# Patient Record
Sex: FEMALE | Race: WHITE | NOT HISPANIC OR LATINO | Employment: FULL TIME | URBAN - NONMETROPOLITAN AREA
[De-identification: names, ages, dates, MRNs, and addresses within clinical notes are randomized per-mention and may not be internally consistent; named-entity substitution may affect disease eponyms.]

---

## 2017-10-20 ENCOUNTER — OFFICE VISIT (OUTPATIENT)
Dept: OBSTETRICS AND GYNECOLOGY | Facility: CLINIC | Age: 52
End: 2017-10-20

## 2017-10-20 VITALS
HEIGHT: 63 IN | SYSTOLIC BLOOD PRESSURE: 120 MMHG | BODY MASS INDEX: 20.73 KG/M2 | DIASTOLIC BLOOD PRESSURE: 80 MMHG | WEIGHT: 117 LBS

## 2017-10-20 DIAGNOSIS — B96.89 BV (BACTERIAL VAGINOSIS): ICD-10-CM

## 2017-10-20 DIAGNOSIS — N76.0 BV (BACTERIAL VAGINOSIS): ICD-10-CM

## 2017-10-20 DIAGNOSIS — B37.31 CANDIDA VAGINITIS: ICD-10-CM

## 2017-10-20 DIAGNOSIS — Z11.3 SCREEN FOR STD (SEXUALLY TRANSMITTED DISEASE): Primary | ICD-10-CM

## 2017-10-20 LAB — WET PREP GENITAL: ABNORMAL

## 2017-10-20 PROCEDURE — 87798 DETECT AGENT NOS DNA AMP: CPT | Performed by: NURSE PRACTITIONER

## 2017-10-20 PROCEDURE — 87210 SMEAR WET MOUNT SALINE/INK: CPT | Performed by: NURSE PRACTITIONER

## 2017-10-20 PROCEDURE — 87481 CANDIDA DNA AMP PROBE: CPT | Performed by: NURSE PRACTITIONER

## 2017-10-20 PROCEDURE — 87512 GARDNER VAG DNA QUANT: CPT | Performed by: NURSE PRACTITIONER

## 2017-10-20 PROCEDURE — 87491 CHLMYD TRACH DNA AMP PROBE: CPT | Performed by: NURSE PRACTITIONER

## 2017-10-20 PROCEDURE — 99202 OFFICE O/P NEW SF 15 MIN: CPT | Performed by: NURSE PRACTITIONER

## 2017-10-20 PROCEDURE — 87661 TRICHOMONAS VAGINALIS AMPLIF: CPT | Performed by: NURSE PRACTITIONER

## 2017-10-20 PROCEDURE — 87591 N.GONORRHOEAE DNA AMP PROB: CPT | Performed by: NURSE PRACTITIONER

## 2017-10-20 RX ORDER — METRONIDAZOLE 500 MG/1
500 TABLET ORAL 2 TIMES DAILY
Qty: 14 TABLET | Refills: 0 | Status: SHIPPED | OUTPATIENT
Start: 2017-10-20 | End: 2017-10-27

## 2017-10-20 NOTE — PATIENT INSTRUCTIONS
Vaginal yeast infection is a condition that causes soreness, swelling, and redness (inflammation) of the vagina. It also causes vaginal discharge. This is a common condition. Some women get this infection frequently.  CAUSES  This condition is caused by a change in the normal balance of the yeast (candida) and bacteria that live in the vagina. This change causes an overgrowth of yeast, which causes the inflammation.  RISK FACTORS  This condition is more likely to develop in:  · Women who take antibiotic medicines.  · Women who have diabetes.  · Women who take birth control pills.  · Women who are pregnant.  · Women who douche often.  · Women who have a weak defense (immune) system.  · Women who have been taking steroid medicines for a long time.  · Women who frequently wear tight clothing.  SYMPTOMS  Symptoms of this condition include:  · White, thick vaginal discharge.  · Swelling, itching, redness, and irritation of the vagina. The lips of the vagina (vulva) may be affected as well.  · Pain or a burning feeling while urinating.  · Pain during sex.  DIAGNOSIS  This condition is diagnosed with a medical history and physical exam. This will include a pelvic exam. Your health care provider will examine a sample of your vaginal discharge under a microscope. Your health care provider may send this sample for testing to confirm the diagnosis.  TREATMENT   This condition is treated with medicine. Medicines may be over-the-counter or prescription. You may be told to use one or more of the following:  · Medicine that is taken orally.  · Medicine that is applied as a cream.  · Medicine that is inserted directly into the vagina (suppository).  HOME CARE INSTRUCTIONS  · Take or apply over-the-counter and prescription medicines only as told by your health care provider.  · Do not have sex until your health care provider has approved. Tell your sex partner that you have a yeast infection. That person should go to his or her  health care provider if he or she develops symptoms.  · Do not wear tight clothes, such as pantyhose or tight pants.  · Avoid using tampons until your health care provider approves.  · Eat more yogurt. This may help to keep your yeast infection from returning.  · Try taking a sitz bath to help with discomfort. This is a warm water bath that is taken while you are sitting down. The water should only come up to your hips and should cover your buttocks. Do this 3-4 times per day or as told by your health care provider.  · Do not douche.  · Wear breathable, cotton underwear.  · If you have diabetes, keep your blood sugar levels under control.  SEEK MEDICAL CARE IF:  · You have a fever.  · Your symptoms go away and then return.  · Your symptoms do not get better with treatment.  · Your symptoms get worse.  · You have new symptoms.  · You develop blisters in or around your vagina.  · You have blood coming from your vagina and it is not your menstrual period.  · You develop pain in your abdomen.     This information is not intended to replace advice given to you by your health care provider. Make sure you discuss any questions you have with your health care provider.     Document Released: 09/27/2006 Document Revised: 04/10/2017 Document Reviewed: 06/20/2016  [employer] Interactive Patient Education ©2017 [employer]  Bacterial Vaginosis  Bacterial vaginosis is a vaginal infection that occurs when the normal balance of bacteria in the vagina is disrupted. It results from an overgrowth of certain bacteria. This is the most common vaginal infection in women of childbearing age. Treatment is important to prevent complications, especially in pregnant women, as it can cause a premature delivery.  CAUSES   Bacterial vaginosis is caused by an increase in harmful bacteria that are normally present in smaller amounts in the vagina. Several different kinds of bacteria can cause bacterial vaginosis. However, the reason that the  condition develops is not fully understood.  RISK FACTORS  Certain activities or behaviors can put you at an increased risk of developing bacterial vaginosis, including:  · Having a new sex partner or multiple sex partners.  · Douching.  · Using an intrauterine device (IUD) for contraception.  Women do not get bacterial vaginosis from toilet seats, bedding, swimming pools, or contact with objects around them.  SIGNS AND SYMPTOMS   Some women with bacterial vaginosis have no signs or symptoms. Common symptoms include:  · Grey vaginal discharge.  · A fishlike odor with discharge, especially after sexual intercourse.  · Itching or burning of the vagina and vulva.  · Burning or pain with urination.  DIAGNOSIS   Your health care provider will take a medical history and examine the vagina for signs of bacterial vaginosis. A sample of vaginal fluid may be taken. Your health care provider will look at this sample under a microscope to check for bacteria and abnormal cells. A vaginal pH test may also be done.   TREATMENT   Bacterial vaginosis may be treated with antibiotic medicines. These may be given in the form of a pill or a vaginal cream. A second round of antibiotics may be prescribed if the condition comes back after treatment. Because bacterial vaginosis increases your risk for sexually transmitted diseases, getting treated can help reduce your risk for chlamydia, gonorrhea, HIV, and herpes.  HOME CARE INSTRUCTIONS   · Only take over-the-counter or prescription medicines as directed by your health care provider.  · If antibiotic medicine was prescribed, take it as directed. Make sure you finish it even if you start to feel better.  · Tell all sexual partners that you have a vaginal infection. They should see their health care provider and be treated if they have problems, such as a mild rash or itching.  · During treatment, it is important that you follow these instructions:  ¨ Avoid sexual activity or use condoms  correctly.  ¨ Do not douche.  ¨ Avoid alcohol as directed by your health care provider.  ¨ Avoid breastfeeding as directed by your health care provider.  SEEK MEDICAL CARE IF:   · Your symptoms are not improving after 3 days of treatment.  · You have increased discharge or pain.  · You have a fever.  MAKE SURE YOU:   · Understand these instructions.  · Will watch your condition.  · Will get help right away if you are not doing well or get worse.  FOR MORE INFORMATION   Centers for Disease Control and Prevention, Division of STD Prevention: www.cdc.gov/std  American Sexual Health Association ([employer]ICK): www.ashastd.org      This information is not intended to replace advice given to you by your health care provider. Make sure you discuss any questions you have with your health care provider.     Document Released: 12/18/2006 Document Revised: 01/08/2016 Document Reviewed: 07/30/2014  [employer] Interactive Patient Education ©2017 [employer]

## 2017-10-20 NOTE — PROGRESS NOTES
Fernanda Reardon is a 52 y.o. female is here today as a self referral.    HPI Comments: I'm here to get STD screening.  I have had a run of bad luck lately. I lost my job after I had just bought a house. My boyfriend cheated on my. I am just so stressed out    Exposure to STD    The patient's pertinent negatives include no dyspareunia or pelvic pain. Pertinent negatives include no abdominal pain or sore throat.       The following portions of the patient's history were reviewed and updated as appropriate: allergies, current medications, past medical history, past social history, past surgical history and problem list.    Review of Systems   Constitutional: Negative for activity change, appetite change, chills and fatigue.   HENT: Negative for congestion, dental problem, ear pain, hearing loss, nosebleeds, rhinorrhea, sneezing, sore throat and voice change.    Eyes: Negative for discharge, redness, itching and visual disturbance.   Respiratory: Negative for apnea, cough, choking, shortness of breath and wheezing.    Cardiovascular: Negative for chest pain and palpitations.   Gastrointestinal: Negative for abdominal distention, abdominal pain, constipation, diarrhea, nausea and vomiting.   Endocrine: Negative for cold intolerance, heat intolerance and polyuria.   Genitourinary: Positive for vaginal discharge (odor, itching ). Negative for difficulty urinating, dyspareunia, flank pain, genital sores, menstrual problem, pelvic pain and vaginal bleeding.   Musculoskeletal: Negative for arthralgias, back pain, myalgias and neck pain.   Skin: Negative for pallor and rash.   Allergic/Immunologic: Negative for environmental allergies and food allergies.   Neurological: Negative for dizziness, tremors, seizures, numbness and headaches.   Hematological: Negative for adenopathy. Does not bruise/bleed easily.   Psychiatric/Behavioral: Negative for agitation, decreased concentration, sleep disturbance and suicidal  ideas. The patient is nervous/anxious.        Objective   Physical Exam   Constitutional: She is oriented to person, place, and time. She appears well-developed and well-nourished.   Genitourinary: Uterus normal. Pelvic exam was performed with patient supine. There is no rash or tenderness on the right labia. There is no rash or tenderness on the left labia. Uterus is not tender. Cervix exhibits no motion tenderness. Right adnexum displays no mass and no tenderness. Left adnexum displays no mass and no tenderness. Vaginal discharge found.   Genitourinary Comments: Uterus, cervix, bilateral adnexa surgically removed  Left ovary present nonpalpable   Urethra and urethral meatus normal  Bladder normal placement  Perineum and rectum examined and intact  Wet Prep + BV yeast No trich found  Bacterial Cultures,  GC Chlamydia cultures taken    Musculoskeletal: Normal range of motion.   Neurological: She is alert and oriented to person, place, and time.   Skin: Skin is warm and dry.   Psychiatric: She has a normal mood and affect. Her behavior is normal. Judgment and thought content normal.   Nursing note and vitals reviewed.        Assessment/Plan   Veronica was seen today for exposure to std.    Diagnoses and all orders for this visit:    Screen for STD (sexually transmitted disease)    Wet prep + BV and yeast  No trich found. Bacterial Cultures with GC and Chlamydia cultures taken. Pt does not have insurance now and is private pay, so we will defer anything else  Presently that she will need.   -     POC Wet Prep  -     Gynecologic Fluid, Supplemental Testing - ThinPrep Vial, Cervix, Endocervix    BV (bacterial vaginosis)     BV is an overgrowth of normal bacteria in the vagina due to change in vaginal ph from blood, semen, antibiotics, hormonal changes. We also discussed that if she did have  Trich then the same medication would work for that also.   -     metroNIDAZOLE (FLAGYL) 500 MG tablet; Take 1 tablet by mouth 2  (Two) Times a Day for 7 days.    Candida vaginitis    Pt will just try OTC monistat for her yeast infection.

## 2017-10-25 LAB
GEN CATEG CVX/VAG CYTO-IMP: NORMAL
LAB AP CASE REPORT: NORMAL
Lab: NORMAL
PATH INTERP SPEC-IMP: NORMAL
STAT OF ADQ CVX/VAG CYTO-IMP: NORMAL

## 2020-06-23 ENCOUNTER — OFFICE VISIT (OUTPATIENT)
Dept: OBSTETRICS AND GYNECOLOGY | Facility: CLINIC | Age: 55
End: 2020-06-23

## 2020-06-23 VITALS
HEIGHT: 63 IN | DIASTOLIC BLOOD PRESSURE: 68 MMHG | BODY MASS INDEX: 21.62 KG/M2 | WEIGHT: 122 LBS | SYSTOLIC BLOOD PRESSURE: 102 MMHG

## 2020-06-23 DIAGNOSIS — N76.0 ACUTE VAGINITIS: Primary | ICD-10-CM

## 2020-06-23 PROCEDURE — 99203 OFFICE O/P NEW LOW 30 MIN: CPT | Performed by: NURSE PRACTITIONER

## 2020-06-23 PROCEDURE — 87481 CANDIDA DNA AMP PROBE: CPT | Performed by: NURSE PRACTITIONER

## 2020-06-23 PROCEDURE — 87563 M. GENITALIUM AMP PROBE: CPT | Performed by: NURSE PRACTITIONER

## 2020-06-23 PROCEDURE — 87491 CHLMYD TRACH DNA AMP PROBE: CPT | Performed by: NURSE PRACTITIONER

## 2020-06-23 PROCEDURE — 87591 N.GONORRHOEAE DNA AMP PROB: CPT | Performed by: NURSE PRACTITIONER

## 2020-06-23 PROCEDURE — 87798 DETECT AGENT NOS DNA AMP: CPT | Performed by: NURSE PRACTITIONER

## 2020-06-23 PROCEDURE — 87512 GARDNER VAG DNA QUANT: CPT | Performed by: NURSE PRACTITIONER

## 2020-06-23 PROCEDURE — 87661 TRICHOMONAS VAGINALIS AMPLIF: CPT | Performed by: NURSE PRACTITIONER

## 2020-06-23 NOTE — PROGRESS NOTES
"    Fernanda Reardon is a 54 y.o. female  YOB: 1965      Chief Complaint   Patient presents with   • Vaginitis     Patient here with c/o recurring vaginal infections. Patient was dx with Trichamonas in 2017 and patient states she hasn't had insurance to be able to get rechecked. Patient also c/o of \"pimple like bumps\" in her vaginal area.         HPI    The following portions of the patient's history were reviewed and updated as appropriate: allergies, current medications, past family history, past medical history, past social history, past surgical history and problem list.    No Known Allergies    History reviewed. No pertinent past medical history.    Family History   Problem Relation Age of Onset   • Hypertension Mother    • Breast cancer Neg Hx    • Ovarian cancer Neg Hx    • Uterine cancer Neg Hx    • Colon cancer Neg Hx        Social History     Socioeconomic History   • Marital status: Single     Spouse name: Not on file   • Number of children: Not on file   • Years of education: Not on file   • Highest education level: Not on file   Tobacco Use   • Smoking status: Current Every Day Smoker     Packs/day: 0.50   • Smokeless tobacco: Never Used   Substance and Sexual Activity   • Alcohol use: Yes     Comment: occasional   • Drug use: No   • Sexual activity: Never     Partners: Male     Birth control/protection: Surgical     Comment: csp 2 years last exposure 2 weeks ago       No current outpatient medications on file.    No LMP recorded. Patient has had a hysterectomy.    Sexual History:         Could not be calculated    Past Surgical History:   Procedure Laterality Date   • APPENDECTOMY     • CERVICAL BIOPSY  W/ LOOP ELECTRODE EXCISION      x3   • HYSTERECTOMY  2002    ELIAS w/ bilateral salpingectomy and right oophrectomy due to abnormal paps   • OOPHORECTOMY Right    • SALPINGECTOMY Bilateral        Review of Systems   Constitutional: Negative for activity change, appetite " change, chills, diaphoresis, fatigue, fever and unexpected weight change.   HENT: Negative for congestion, dental problem, drooling, ear discharge, ear pain, facial swelling, hearing loss, mouth sores, nosebleeds, postnasal drip, rhinorrhea, sinus pressure, sinus pain, sneezing, sore throat, tinnitus, trouble swallowing and voice change.    Eyes: Negative for photophobia, pain, discharge, redness, itching and visual disturbance.   Respiratory: Negative for apnea, cough, choking, chest tightness, shortness of breath, wheezing and stridor.    Cardiovascular: Negative for chest pain, palpitations and leg swelling.   Gastrointestinal: Negative for abdominal distention, abdominal pain, anal bleeding, blood in stool, constipation, diarrhea, nausea, rectal pain and vomiting.   Endocrine: Negative for cold intolerance, heat intolerance, polydipsia, polyphagia and polyuria.   Genitourinary: Positive for vaginal discharge. Negative for decreased urine volume, difficulty urinating, dyspareunia, dysuria, enuresis, flank pain, frequency, genital sores, hematuria, menstrual problem, pelvic pain, urgency, vaginal bleeding and vaginal pain.   Musculoskeletal: Negative for arthralgias, back pain, gait problem, joint swelling, myalgias, neck pain and neck stiffness.   Skin: Negative for color change, pallor, rash and wound.   Allergic/Immunologic: Negative for environmental allergies, food allergies and immunocompromised state.   Neurological: Negative for dizziness, tremors, seizures, syncope, facial asymmetry, speech difficulty, weakness, light-headedness, numbness and headaches.   Hematological: Negative for adenopathy. Does not bruise/bleed easily.   Psychiatric/Behavioral: Negative for agitation, behavioral problems, confusion, decreased concentration, dysphoric mood, hallucinations, self-injury, sleep disturbance and suicidal ideas. The patient is not nervous/anxious and is not hyperactive.        Objective   Physical Exam  "  Constitutional: She is oriented to person, place, and time. She appears well-developed and well-nourished.   HENT:   Head: Normocephalic.   Eyes: Pupils are equal, round, and reactive to light.   Neck: Normal range of motion.   Cardiovascular: Normal rate and regular rhythm.   Pulmonary/Chest: Effort normal and breath sounds normal.   Abdominal: Soft.   Genitourinary: No erythema, tenderness or bleeding in the vagina. No foreign body in the vagina. No signs of injury around the vagina. Vaginal discharge found.   Musculoskeletal: Normal range of motion.   Neurological: She is alert and oriented to person, place, and time.   Skin: Skin is warm and dry.   Psychiatric: She has a normal mood and affect. Her behavior is normal.   Nursing note and vitals reviewed.        Vitals:    06/23/20 0850   BP: 102/68   Weight: 55.3 kg (122 lb)   Height: 160 cm (63\")       Veronica was seen today for vaginitis.    Diagnoses and all orders for this visit:    Acute vaginitis  Comments:  Patient here with complaint of recurring vaginal infections.  Was diagnosed with trichomonas in 2017 but states she did not have insurance and was unable to have a recheck test.  BV/STD panel done [id number] pending results.  Patient to schedule yearly          Patient's Body mass index is 21.61 kg/m². BMI is within normal parameters. No follow-up required..             Non-Smoker    MyChart Instructions Given       "

## 2020-06-24 LAB
C TRACH RRNA CVX QL NAA+PROBE: NOT DETECTED
N GONORRHOEA RRNA SPEC QL NAA+PROBE: NOT DETECTED
TRICHOMONAS VAGINALIS PCR: NOT DETECTED

## 2020-06-29 DIAGNOSIS — B96.89 GARDNERELLA VAGINALIS INFECTION: ICD-10-CM

## 2020-06-29 DIAGNOSIS — B37.31 YEAST VAGINITIS: Primary | ICD-10-CM

## 2020-06-29 DIAGNOSIS — A49.3 MYCOPLASMA INFECTION: ICD-10-CM

## 2020-06-29 DIAGNOSIS — N76.0 GARDNERELLA VAGINALIS INFECTION: ICD-10-CM

## 2020-06-29 LAB
LAB AP CASE REPORT: NORMAL
Lab: NORMAL
PATH INTERP SPEC-IMP: NORMAL
STAT OF ADQ CVX/VAG CYTO-IMP: NORMAL

## 2020-06-29 RX ORDER — DOXYCYCLINE 100 MG/1
100 CAPSULE ORAL EVERY 12 HOURS SCHEDULED
Qty: 14 CAPSULE | Refills: 0 | Status: SHIPPED | OUTPATIENT
Start: 2020-06-29 | End: 2020-07-23

## 2020-06-29 RX ORDER — METRONIDAZOLE 500 MG/1
500 TABLET ORAL 2 TIMES DAILY
Qty: 14 TABLET | Refills: 0 | Status: SHIPPED | OUTPATIENT
Start: 2020-06-29 | End: 2020-07-06

## 2020-06-29 NOTE — PROGRESS NOTES
Please let patient know that infection panel was positive for yeast, Gardnerella vaginalis, and mycoplasma hominis.  Meds have been sent to her pharmacy.  Okay to do test of cure at least 2 weeks after finishing all meds.

## 2020-07-23 ENCOUNTER — OFFICE VISIT (OUTPATIENT)
Dept: OBSTETRICS AND GYNECOLOGY | Facility: CLINIC | Age: 55
End: 2020-07-23

## 2020-07-23 VITALS
BODY MASS INDEX: 20.55 KG/M2 | WEIGHT: 116 LBS | HEIGHT: 63 IN | SYSTOLIC BLOOD PRESSURE: 112 MMHG | DIASTOLIC BLOOD PRESSURE: 70 MMHG

## 2020-07-23 DIAGNOSIS — B37.31 YEAST VAGINITIS: ICD-10-CM

## 2020-07-23 DIAGNOSIS — A49.3 MYCOPLASMA INFECTION: ICD-10-CM

## 2020-07-23 DIAGNOSIS — N76.0 GARDNERELLA VAGINALIS INFECTION: ICD-10-CM

## 2020-07-23 DIAGNOSIS — B96.89 GARDNERELLA VAGINALIS INFECTION: ICD-10-CM

## 2020-07-23 DIAGNOSIS — N76.0 ACUTE VAGINITIS: Primary | ICD-10-CM

## 2020-07-23 PROCEDURE — 99213 OFFICE O/P EST LOW 20 MIN: CPT | Performed by: NURSE PRACTITIONER

## 2020-07-23 PROCEDURE — 87661 TRICHOMONAS VAGINALIS AMPLIF: CPT | Performed by: NURSE PRACTITIONER

## 2020-07-23 PROCEDURE — 87563 M. GENITALIUM AMP PROBE: CPT | Performed by: NURSE PRACTITIONER

## 2020-07-23 PROCEDURE — 87481 CANDIDA DNA AMP PROBE: CPT | Performed by: NURSE PRACTITIONER

## 2020-07-23 PROCEDURE — 87512 GARDNER VAG DNA QUANT: CPT | Performed by: NURSE PRACTITIONER

## 2020-07-23 PROCEDURE — 87798 DETECT AGENT NOS DNA AMP: CPT | Performed by: NURSE PRACTITIONER

## 2020-07-23 NOTE — PROGRESS NOTES
Fernanda Reardon is a 55 y.o. female  YOB: 1965      Chief Complaint   Patient presents with   • Follow-up     Patient here for DEJA after infection panel showed yeast, gardnerella and mycoplasma. Patient was treated with Doxy, Terazol and Flagyl. Patient finished medications but states she thinks she still has a slight odor.        Vaginitis   This is a new problem. The current episode started 1 to 4 weeks ago. Pertinent negatives include no abdominal pain, arthralgias, chest pain, chills, congestion, coughing, diaphoresis, fatigue, fever, headaches, joint swelling, myalgias, nausea, neck pain, numbness, rash, sore throat, vomiting or weakness.       The following portions of the patient's history were reviewed and updated as appropriate: allergies, current medications, past family history, past medical history, past social history, past surgical history and problem list.    No Known Allergies    History reviewed. No pertinent past medical history.    Family History   Problem Relation Age of Onset   • Hypertension Mother    • Breast cancer Neg Hx    • Ovarian cancer Neg Hx    • Uterine cancer Neg Hx    • Colon cancer Neg Hx        Social History     Socioeconomic History   • Marital status: Single     Spouse name: Not on file   • Number of children: Not on file   • Years of education: Not on file   • Highest education level: Not on file   Tobacco Use   • Smoking status: Current Every Day Smoker     Packs/day: 0.50   • Smokeless tobacco: Never Used   Substance and Sexual Activity   • Alcohol use: Yes     Comment: occasional   • Drug use: No   • Sexual activity: Never     Partners: Male     Birth control/protection: Surgical     Comment: csp 2 years last exposure 2 weeks ago       No current outpatient medications on file.    No LMP recorded. Patient has had a hysterectomy.    Sexual History:         Could not be calculated    Past Surgical History:   Procedure Laterality Date   •  APPENDECTOMY     • CERVICAL BIOPSY  W/ LOOP ELECTRODE EXCISION      x3   • HYSTERECTOMY  2002    ELIAS w/ bilateral salpingectomy and right oophrectomy due to abnormal paps   • OOPHORECTOMY Right    • SALPINGECTOMY Bilateral        Review of Systems   Constitutional: Negative for activity change, appetite change, chills, diaphoresis, fatigue, fever and unexpected weight change.   HENT: Negative for congestion, dental problem, drooling, ear discharge, ear pain, facial swelling, hearing loss, mouth sores, nosebleeds, postnasal drip, rhinorrhea, sinus pressure, sinus pain, sneezing, sore throat, tinnitus, trouble swallowing and voice change.    Eyes: Negative for photophobia, pain, discharge, redness, itching and visual disturbance.   Respiratory: Negative for apnea, cough, choking, chest tightness, shortness of breath, wheezing and stridor.    Cardiovascular: Negative for chest pain, palpitations and leg swelling.   Gastrointestinal: Negative for abdominal distention, abdominal pain, anal bleeding, blood in stool, constipation, diarrhea, nausea, rectal pain and vomiting.   Endocrine: Negative for cold intolerance, heat intolerance, polydipsia, polyphagia and polyuria.   Genitourinary: Positive for vaginal discharge. Negative for decreased urine volume, difficulty urinating, dyspareunia, dysuria, enuresis, flank pain, frequency, genital sores, hematuria, menstrual problem, pelvic pain, urgency, vaginal bleeding and vaginal pain.   Musculoskeletal: Negative for arthralgias, back pain, gait problem, joint swelling, myalgias, neck pain and neck stiffness.   Skin: Negative for color change, pallor, rash and wound.   Allergic/Immunologic: Negative for environmental allergies, food allergies and immunocompromised state.   Neurological: Negative for dizziness, tremors, seizures, syncope, facial asymmetry, speech difficulty, weakness, light-headedness, numbness and headaches.   Hematological: Negative for adenopathy. Does not  "bruise/bleed easily.   Psychiatric/Behavioral: Negative for agitation, behavioral problems, confusion, decreased concentration, dysphoric mood, hallucinations, self-injury, sleep disturbance and suicidal ideas. The patient is not nervous/anxious and is not hyperactive.        Objective   Physical Exam   Constitutional: She is oriented to person, place, and time. She appears well-developed and well-nourished.   HENT:   Head: Normocephalic.   Eyes: Pupils are equal, round, and reactive to light.   Neck: Normal range of motion.   Cardiovascular: Normal rate and regular rhythm.   Pulmonary/Chest: Effort normal and breath sounds normal.   Abdominal: Soft.   Genitourinary: No erythema, tenderness or bleeding in the vagina. No foreign body in the vagina. No signs of injury around the vagina. Vaginal discharge found.   Musculoskeletal: Normal range of motion.   Neurological: She is alert and oriented to person, place, and time.   Skin: Skin is warm and dry.   Psychiatric: She has a normal mood and affect. Her behavior is normal.   Nursing note and vitals reviewed.        Vitals:    07/23/20 1401   BP: 112/70   Weight: 52.6 kg (116 lb)   Height: 160 cm (63\")       Veronica was seen today for follow-up.    Diagnoses and all orders for this visit:    Acute vaginitis  Comments:  Patient here for test of cure after being recently diagnosed and treated for Gardnerella vaginalis, mycoplasma hominis, and yeast.  Test of cure done today-follow up pending results.     Gardnerella vaginalis infection    Mycoplasma infection    Yeast vaginitis          Patient's Body mass index is 20.55 kg/m². BMI is within normal parameters. No follow-up required..             Non-Smoker    MyChart Instructions Given       "

## 2020-07-27 LAB — TRICHOMONAS VAGINALIS PCR: NOT DETECTED

## 2020-08-04 ENCOUNTER — TELEPHONE (OUTPATIENT)
Dept: OBSTETRICS AND GYNECOLOGY | Facility: CLINIC | Age: 55
End: 2020-08-04

## 2020-08-17 LAB
LAB AP CASE REPORT: NORMAL
Lab: NORMAL
STAT OF ADQ CVX/VAG CYTO-IMP: NORMAL

## 2020-08-21 DIAGNOSIS — B96.89 GARDNERELLA VAGINALIS INFECTION: Primary | ICD-10-CM

## 2020-08-21 DIAGNOSIS — A49.3 MYCOPLASMA INFECTION: ICD-10-CM

## 2020-08-21 DIAGNOSIS — N76.0 GARDNERELLA VAGINALIS INFECTION: Primary | ICD-10-CM

## 2020-08-21 RX ORDER — METRONIDAZOLE 500 MG/1
500 TABLET ORAL 2 TIMES DAILY
Qty: 14 TABLET | Refills: 0 | Status: SHIPPED | OUTPATIENT
Start: 2020-08-21 | End: 2020-08-28

## 2020-08-21 RX ORDER — DOXYCYCLINE 100 MG/1
100 CAPSULE ORAL EVERY 12 HOURS SCHEDULED
Qty: 14 CAPSULE | Refills: 0 | Status: SHIPPED | OUTPATIENT
Start: 2020-08-21 | End: 2021-07-07

## 2021-07-07 ENCOUNTER — HOSPITAL ENCOUNTER (OUTPATIENT)
Dept: GENERAL RADIOLOGY | Facility: HOSPITAL | Age: 56
Discharge: HOME | End: 2021-07-07
Admitting: NURSE PRACTITIONER

## 2021-07-07 ENCOUNTER — OFFICE VISIT (OUTPATIENT)
Dept: FAMILY MEDICINE CLINIC | Facility: CLINIC | Age: 56
End: 2021-07-07

## 2021-07-07 VITALS
HEART RATE: 104 BPM | DIASTOLIC BLOOD PRESSURE: 72 MMHG | BODY MASS INDEX: 19.7 KG/M2 | WEIGHT: 115.4 LBS | RESPIRATION RATE: 18 BRPM | OXYGEN SATURATION: 95 % | HEIGHT: 64 IN | SYSTOLIC BLOOD PRESSURE: 112 MMHG | TEMPERATURE: 97.9 F

## 2021-07-07 DIAGNOSIS — M54.12 CERVICAL RADICULOPATHY: Primary | ICD-10-CM

## 2021-07-07 DIAGNOSIS — M54.12 CERVICAL RADICULOPATHY: ICD-10-CM

## 2021-07-07 DIAGNOSIS — M54.2 NECK PAIN: ICD-10-CM

## 2021-07-07 DIAGNOSIS — R20.0 NUMBNESS AND TINGLING IN LEFT ARM: ICD-10-CM

## 2021-07-07 DIAGNOSIS — R20.2 NUMBNESS AND TINGLING IN LEFT ARM: ICD-10-CM

## 2021-07-07 PROCEDURE — 99213 OFFICE O/P EST LOW 20 MIN: CPT | Performed by: NURSE PRACTITIONER

## 2021-07-07 PROCEDURE — 72040 X-RAY EXAM NECK SPINE 2-3 VW: CPT

## 2021-07-07 PROCEDURE — 96372 THER/PROPH/DIAG INJ SC/IM: CPT | Performed by: NURSE PRACTITIONER

## 2021-07-07 RX ORDER — METHYLPREDNISOLONE 4 MG/1
TABLET ORAL
Qty: 1 EACH | Refills: 0 | Status: SHIPPED | OUTPATIENT
Start: 2021-07-07 | End: 2022-07-22

## 2021-07-07 RX ORDER — DEXAMETHASONE SODIUM PHOSPHATE 4 MG/ML
8 INJECTION, SOLUTION INTRA-ARTICULAR; INTRALESIONAL; INTRAMUSCULAR; INTRAVENOUS; SOFT TISSUE ONCE
Status: COMPLETED | OUTPATIENT
Start: 2021-07-07 | End: 2021-07-07

## 2021-07-07 RX ORDER — TIZANIDINE HYDROCHLORIDE 4 MG/1
4 CAPSULE, GELATIN COATED ORAL NIGHTLY PRN
Qty: 20 CAPSULE | Refills: 0 | Status: SHIPPED | OUTPATIENT
Start: 2021-07-07 | End: 2022-07-22

## 2021-07-07 RX ADMIN — DEXAMETHASONE SODIUM PHOSPHATE 8 MG: 4 INJECTION, SOLUTION INTRA-ARTICULAR; INTRALESIONAL; INTRAMUSCULAR; INTRAVENOUS; SOFT TISSUE at 16:32

## 2021-07-07 NOTE — PROGRESS NOTES
"Chief Complaint  Neck Pain    Subjective          Veronica Reardon presents to [facility]  Patient presents today for neck and left shoulder pain.  Pt states symptoms present for the past two weeks.    Neck Pain   This is a new problem. The current episode started 1 to 4 weeks ago. The problem occurs constantly. The problem has been gradually worsening. The quality of the pain is described as aching, burning, cramping, shooting and stabbing. The pain is at a severity of 7/10. The pain is moderate. The symptoms are aggravated by position, twisting and bending. The pain is worse during the day. She has tried NSAIDs for the symptoms. The treatment provided no relief.       Objective   Vital Signs:   /72 (BP Location: Right arm, Patient Position: Sitting, Cuff Size: Adult)   Pulse 104   Temp 97.9 °F (36.6 °C) (Infrared)   Resp 18   Ht 162.6 cm (64\")   Wt 52.3 kg (115 lb 6.4 oz)   SpO2 95%   BMI 19.81 kg/m²     Physical Exam  Constitutional:       Appearance: Normal appearance. She is well-developed.   HENT:      Head: Normocephalic and atraumatic.      Right Ear: Tympanic membrane, ear canal and external ear normal.      Left Ear: Tympanic membrane, ear canal and external ear normal.      Nose: Nose normal. No septal deviation, nasal tenderness or congestion.      Mouth/Throat:      Lips: Pink. No lesions.      Mouth: Mucous membranes are moist. No oral lesions.      Dentition: Normal dentition.      Pharynx: Oropharynx is clear. No pharyngeal swelling, oropharyngeal exudate or posterior oropharyngeal erythema.   Eyes:      General: Lids are normal. Vision grossly intact. No scleral icterus.        Right eye: No discharge.         Left eye: No discharge.      Extraocular Movements: Extraocular movements intact.      Conjunctiva/sclera: Conjunctivae normal.      Right eye: Right conjunctiva is not injected.      Left eye: Left conjunctiva is not injected.      Pupils: Pupils " are equal, round, and reactive to light.   Neck:      Thyroid: No thyroid mass.      Trachea: Trachea normal.   Cardiovascular:      Rate and Rhythm: Normal rate and regular rhythm.      Heart sounds: Normal heart sounds. No murmur heard.   No gallop.    Pulmonary:      Effort: Pulmonary effort is normal.      Breath sounds: Normal breath sounds and air entry. No wheezing, rhonchi or rales.   Abdominal:      General: There is no distension.      Palpations: Abdomen is soft. There is no mass.      Tenderness: There is no abdominal tenderness. There is no right CVA tenderness, left CVA tenderness, guarding or rebound.   Musculoskeletal:         General: No tenderness or deformity. Normal range of motion.      Cervical back: Full passive range of motion without pain, normal range of motion and neck supple.      Thoracic back: Normal.      Right lower leg: No edema.      Left lower leg: No edema.   Skin:     General: Skin is warm and dry.      Coloration: Skin is not jaundiced.      Findings: No rash.   Neurological:      Mental Status: She is alert and oriented to person, place, and time.      Cranial Nerves: Cranial nerves are intact.      Sensory: Sensation is intact.      Motor: Motor function is intact.      Coordination: Coordination is intact.      Gait: Gait is intact.      Deep Tendon Reflexes: Reflexes are normal and symmetric.   Psychiatric:         Mood and Affect: Mood and affect normal.         Judgment: Judgment normal.        Result Review :              Assessment and Plan    Diagnoses and all orders for this visit:    1. Cervical radiculopathy (Primary)  -     XR Spine Cervical 2 or 3 View; Future  -     methylPREDNISolone (MEDROL) 4 MG dose pack; Take as directed on package instructions.  Dispense: 1 each; Refill: 0  -     dexamethasone (DECADRON) injection 8 mg  -     diclofenac (VOLTAREN) 50 MG EC tablet; Take 1 tablet by mouth 2 (Two) Times a Day.  Dispense: 60 tablet; Refill: 1  -     TiZANidine  (Zanaflex) 4 MG capsule; Take 1 capsule by mouth At Night As Needed for Muscle Spasms.  Dispense: 20 capsule; Refill: 0    2. Neck pain    3. Numbness and tingling in left arm    4. BMI less than 19,adult        New pt here today with c/o pain to his neck and left shoulder blade.  Pain began 2 weeks ago. Pain is located to the left side of her neck , going down to her shoulder blade as well as down to her left arm.  She also complains of numbness and tingling down her left arm that occurs often. She states the numbness and tingling occurs with certain positions or way she turns her neck. Her pain worsens with sitting, driving, or staying in one position for a long period of time. She works as a cleaning lady at a local hotel and states tasks such as mopping, making beds, and loading a washing machine exacerbates the pain. States she has had this happen years ago but the pain was located more to her lower back. She has tried ibuprofen with no improvements.     Plan:    1. Will xray cervical spine today. Steroid injection in office today. Will send steroid pack to start tomorrow. Recommended to take tylenol arthritis while taking steroid packs. Diclofenac sent to start once steroids are completed. Will also send muscle relaxer to take prn at bedtime. Medication counseling done. Will make further recommendations when xray results are available to review. F/u if symptoms do not improve or worsen with treatment.       Follow Up   Return if symptoms worsen or fail to improve.  Patient was given instructions and counseling regarding her condition or for health maintenance advice. Please see specific information pulled into the AVS if appropriate.

## 2021-07-08 ENCOUNTER — TELEPHONE (OUTPATIENT)
Dept: FAMILY MEDICINE CLINIC | Facility: CLINIC | Age: 56
End: 2021-07-08

## 2021-07-08 ENCOUNTER — HOSPITAL ENCOUNTER (OUTPATIENT)
Dept: ULTRASOUND IMAGING | Facility: HOSPITAL | Age: 56
Discharge: HOME | End: 2021-07-08
Admitting: NURSE PRACTITIONER

## 2021-07-08 DIAGNOSIS — I65.21 CALCIFICATION OF RIGHT CAROTID ARTERY: ICD-10-CM

## 2021-07-08 DIAGNOSIS — I65.21 CALCIFICATION OF RIGHT CAROTID ARTERY: Primary | ICD-10-CM

## 2021-07-08 PROCEDURE — 93880 EXTRACRANIAL BILAT STUDY: CPT

## 2021-07-08 NOTE — TELEPHONE ENCOUNTER
----- Message from SOHAN Rothman sent at 2021 12:11 PM CDT -----  Calcified lymph node noted on right side of neck, rather than arterial calcification. No hemodynamically significant ICA stenosis noted.       Contacted pt, verified name and . Informed of the above per provider and answered all pt questions. Pt vu of all.

## 2021-07-08 NOTE — TELEPHONE ENCOUNTER
----- Message from SOHAN Rothman sent at 2021  5:02 PM CDT -----  Xray of cervical spine shows degenerative changes, nothing acute. Also shows right carotid artery calcification present. Needs carotid US to further work this up.     Contacted pt, verified name and . Advised of the above per provider. Pt vu of all and agreed. Placed order and pending to provider to sign. Pt states she is coming now to have done next door.

## 2021-11-24 ENCOUNTER — LAB (OUTPATIENT)
Dept: LAB | Facility: HOSPITAL | Age: 56
End: 2021-11-24

## 2021-11-24 ENCOUNTER — OFFICE VISIT (OUTPATIENT)
Dept: FAMILY MEDICINE CLINIC | Facility: CLINIC | Age: 56
End: 2021-11-24

## 2021-11-24 VITALS
DIASTOLIC BLOOD PRESSURE: 73 MMHG | SYSTOLIC BLOOD PRESSURE: 120 MMHG | BODY MASS INDEX: 21.09 KG/M2 | HEIGHT: 63 IN | TEMPERATURE: 98.5 F | HEART RATE: 72 BPM | WEIGHT: 119 LBS

## 2021-11-24 DIAGNOSIS — R30.0 DYSURIA: ICD-10-CM

## 2021-11-24 DIAGNOSIS — N30.01 ACUTE CYSTITIS WITH HEMATURIA: Primary | ICD-10-CM

## 2021-11-24 LAB
BACTERIA UR QL AUTO: ABNORMAL /HPF
BILIRUB UR QL STRIP: NEGATIVE
CLARITY UR: CLEAR
COLOR UR: YELLOW
GLUCOSE UR STRIP-MCNC: NEGATIVE MG/DL
HGB UR QL STRIP.AUTO: ABNORMAL
HYALINE CASTS UR QL AUTO: ABNORMAL /LPF
KETONES UR QL STRIP: NEGATIVE
LEUKOCYTE ESTERASE UR QL STRIP.AUTO: ABNORMAL
NITRITE UR QL STRIP: NEGATIVE
PH UR STRIP.AUTO: 6.5 [PH] (ref 5–8)
PROT UR QL STRIP: NEGATIVE
RBC # UR STRIP: ABNORMAL /HPF
REF LAB TEST METHOD: ABNORMAL
SP GR UR STRIP: 1.01 (ref 1–1.03)
SQUAMOUS #/AREA URNS HPF: ABNORMAL /HPF
UROBILINOGEN UR QL STRIP: ABNORMAL
WBC # UR STRIP: ABNORMAL /HPF

## 2021-11-24 PROCEDURE — 81001 URINALYSIS AUTO W/SCOPE: CPT

## 2021-11-24 PROCEDURE — 87077 CULTURE AEROBIC IDENTIFY: CPT

## 2021-11-24 PROCEDURE — 99213 OFFICE O/P EST LOW 20 MIN: CPT | Performed by: NURSE PRACTITIONER

## 2021-11-24 PROCEDURE — 87086 URINE CULTURE/COLONY COUNT: CPT

## 2021-11-24 PROCEDURE — 87186 SC STD MICRODIL/AGAR DIL: CPT

## 2021-11-24 RX ORDER — NITROFURANTOIN 25; 75 MG/1; MG/1
100 CAPSULE ORAL 2 TIMES DAILY
Qty: 20 CAPSULE | Refills: 0 | Status: SHIPPED | OUTPATIENT
Start: 2021-11-24 | End: 2022-07-22

## 2021-11-24 NOTE — PROGRESS NOTES
"Chief Complaint  Urinary Tract Infection (started 4 days ago)    Fernanda Reardon presents to [facility]  Pt here for possible UTI, symptoms started 4 days ago. Pt C/o burning and pain with urination.     Urinary Tract Infection   This is a new problem. The current episode started in the past 7 days. The problem has been gradually worsening. The quality of the pain is described as aching. The pain is at a severity of 10/10. The pain is severe. There has been no fever. The fever has been present for less than 1 day. Associated symptoms include frequency. She has tried acetaminophen for the symptoms. The treatment provided no relief.       Objective   Vital Signs:   /73   Pulse 72   Temp 98.5 °F (36.9 °C)   Ht 160 cm (63\")   Wt 54 kg (119 lb)   BMI 21.08 kg/m²     Physical Exam  Constitutional:       Appearance: Normal appearance. She is well-developed.   HENT:      Head: Normocephalic and atraumatic.      Right Ear: External ear normal.      Left Ear: External ear normal.      Nose: Nose normal. No nasal tenderness or congestion.      Mouth/Throat:      Lips: Pink. No lesions.      Mouth: Mucous membranes are moist. No oral lesions.      Dentition: Normal dentition.      Pharynx: Oropharynx is clear. No pharyngeal swelling, oropharyngeal exudate or posterior oropharyngeal erythema.   Eyes:      General: Lids are normal. Vision grossly intact. No scleral icterus.        Right eye: No discharge.         Left eye: No discharge.      Extraocular Movements: Extraocular movements intact.      Conjunctiva/sclera: Conjunctivae normal.      Right eye: Right conjunctiva is not injected.      Left eye: Left conjunctiva is not injected.      Pupils: Pupils are equal, round, and reactive to light.   Cardiovascular:      Rate and Rhythm: Normal rate and regular rhythm.      Heart sounds: Normal heart sounds. No murmur heard.  No gallop.    Pulmonary:      Effort: " Pulmonary effort is normal.      Breath sounds: Normal breath sounds and air entry. No wheezing, rhonchi or rales.   Musculoskeletal:         General: No tenderness or deformity. Normal range of motion.      Cervical back: Full passive range of motion without pain, normal range of motion and neck supple.      Right lower leg: No edema.      Left lower leg: No edema.   Skin:     General: Skin is warm and dry.      Coloration: Skin is not jaundiced.      Findings: No rash.   Neurological:      Mental Status: She is alert and oriented to person, place, and time.      Cranial Nerves: Cranial nerves are intact.      Sensory: Sensation is intact.      Motor: Motor function is intact.      Coordination: Coordination is intact.      Gait: Gait is intact.   Psychiatric:         Attention and Perception: Attention normal.         Mood and Affect: Mood and affect normal.         Behavior: Behavior is not hyperactive. Behavior is cooperative.         Thought Content: Thought content normal.         Judgment: Judgment normal.        Result Review :   The following data was reviewed by: SOHAN Rao on 11/24/2021:  Urinalysis, Microscopic Only - Urine, Clean Catch (11/24/2021 10:42)  Urine Culture - Urine, Urine, Clean Catch (11/24/2021 10:42)  Urinalysis With Culture If Indicated - Urine, Clean Catch (11/24/2021 10:42)           Assessment and Plan    Diagnoses and all orders for this visit:    1. Acute cystitis with hematuria (Primary)  -     nitrofurantoin, macrocrystal-monohydrate, (Macrobid) 100 MG capsule; Take 1 capsule by mouth 2 (Two) Times a Day.  Dispense: 20 capsule; Refill: 0    2. Dysuria  -     Urinalysis With Culture If Indicated - Urine, Clean Catch; Future    3. BMI 21.0-21.9, adult    Patient here today with complaints of burning with urination, frequency, urgency, and cloudy urine.  She denies any fever.  Symptoms began about 4 days ago.  Urinalysis in office today shows moderate amount of blood and  large amount of leuks as well as 1+ bacteria.  Will start Macrobid at this time.  Culture pending.  Will make further recommendations when culture results are available.  Instructed to increase water intake.  Follow-up if symptoms do not improve or become worse.      Follow Up   Return if symptoms worsen or fail to improve.  Patient was given instructions and counseling regarding her condition or for health maintenance advice. Please see specific information pulled into the AVS if appropriate.

## 2021-11-24 NOTE — PROGRESS NOTES
"Chief Complaint  Urinary Tract Infection (started 4 days ago)    Subjective    History of Present Illness {CC  Problem List  Visit Diagnosis   Encounters  Notes  Medications  Labs  Result Review Imaging  Media :23}     Patient presents to [facility] for   History of Present Illness     Review of Systems    I have reviewed and agree with the {HPI ROS Review:70454} information as above.  SOHAN Rao     Objective   Vital Signs:   /73   Pulse 72   Temp 98.5 °F (36.9 °C)   Ht 160 cm (63\")   Wt 54 kg (119 lb)   BMI 21.08 kg/m²       Physical Exam     Result Review  Data Reviewed:{ Labs  Result Review  Imaging  Med Tab  Media :23}   {The following data was reviewed by (Optional):53362}  {Ambulatory Labs (Optional):39898}  {Data reviewed (Optional):47976:::1}            Assessment and Plan {CC Problem List  Visit Diagnosis  ROS  Review (Popup)  Health Maintenance  Quality  BestPractice  Medications  SmartSets  SnapShot Encounters  Media :23}     Problem List Items Addressed This Visit     None      Visit Diagnoses     Dysuria    -  Primary    Relevant Orders    Urinalysis With Culture If Indicated - Urine, Clean Catch (Completed)          Began 3-4 days ago. Urine cloudy, dysuria, frequency, urgency. No fever.     Follow Up {Instructions Charge Capture  Follow-up Communications :23}  No follow-ups on file.  Patient was given instructions and counseling regarding her condition or for health maintenance advice. Please see specific information pulled into the AVS if appropriate.       "

## 2021-11-26 LAB — BACTERIA SPEC AEROBE CULT: ABNORMAL

## 2022-07-14 ENCOUNTER — TELEPHONE (OUTPATIENT)
Dept: FAMILY MEDICINE CLINIC | Facility: CLINIC | Age: 57
End: 2022-07-14

## 2022-07-22 ENCOUNTER — LAB (OUTPATIENT)
Dept: LAB | Facility: HOSPITAL | Age: 57
End: 2022-07-22

## 2022-07-22 ENCOUNTER — OFFICE VISIT (OUTPATIENT)
Dept: FAMILY MEDICINE CLINIC | Facility: CLINIC | Age: 57
End: 2022-07-22

## 2022-07-22 VITALS
HEART RATE: 108 BPM | HEIGHT: 63 IN | OXYGEN SATURATION: 97 % | SYSTOLIC BLOOD PRESSURE: 117 MMHG | BODY MASS INDEX: 20.2 KG/M2 | DIASTOLIC BLOOD PRESSURE: 73 MMHG | TEMPERATURE: 97.5 F | WEIGHT: 114 LBS | RESPIRATION RATE: 16 BRPM

## 2022-07-22 DIAGNOSIS — N39.0 URINARY TRACT INFECTION WITH HEMATURIA, SITE UNSPECIFIED: Primary | ICD-10-CM

## 2022-07-22 DIAGNOSIS — H65.93 FLUID LEVEL BEHIND TYMPANIC MEMBRANE OF BOTH EARS: ICD-10-CM

## 2022-07-22 DIAGNOSIS — R35.0 URINARY FREQUENCY: ICD-10-CM

## 2022-07-22 DIAGNOSIS — R31.9 URINARY TRACT INFECTION WITH HEMATURIA, SITE UNSPECIFIED: Primary | ICD-10-CM

## 2022-07-22 LAB
BACTERIA UR QL AUTO: ABNORMAL /HPF
BILIRUB UR QL STRIP: NEGATIVE
CLARITY UR: CLEAR
COLOR UR: YELLOW
GLUCOSE UR STRIP-MCNC: NEGATIVE MG/DL
HGB UR QL STRIP.AUTO: ABNORMAL
HYALINE CASTS UR QL AUTO: ABNORMAL /LPF
KETONES UR QL STRIP: NEGATIVE
LEUKOCYTE ESTERASE UR QL STRIP.AUTO: ABNORMAL
NITRITE UR QL STRIP: POSITIVE
PH UR STRIP.AUTO: 6 [PH] (ref 5–8)
PROT UR QL STRIP: NEGATIVE
RBC # UR STRIP: ABNORMAL /HPF
REF LAB TEST METHOD: ABNORMAL
SP GR UR STRIP: 1.01 (ref 1–1.03)
SQUAMOUS #/AREA URNS HPF: ABNORMAL /HPF
UROBILINOGEN UR QL STRIP: ABNORMAL
WBC # UR STRIP: ABNORMAL /HPF

## 2022-07-22 PROCEDURE — 87086 URINE CULTURE/COLONY COUNT: CPT

## 2022-07-22 PROCEDURE — 87186 SC STD MICRODIL/AGAR DIL: CPT

## 2022-07-22 PROCEDURE — 81001 URINALYSIS AUTO W/SCOPE: CPT

## 2022-07-22 PROCEDURE — 99213 OFFICE O/P EST LOW 20 MIN: CPT | Performed by: NURSE PRACTITIONER

## 2022-07-22 PROCEDURE — 87077 CULTURE AEROBIC IDENTIFY: CPT

## 2022-07-22 RX ORDER — NITROFURANTOIN 25; 75 MG/1; MG/1
100 CAPSULE ORAL 2 TIMES DAILY
Qty: 20 CAPSULE | Refills: 0 | Status: SHIPPED | OUTPATIENT
Start: 2022-07-22

## 2022-07-22 RX ORDER — FLUTICASONE PROPIONATE 50 MCG
2 SPRAY, SUSPENSION (ML) NASAL DAILY
Qty: 15.8 ML | Refills: 2 | Status: SHIPPED | OUTPATIENT
Start: 2022-07-22

## 2022-07-22 NOTE — PROGRESS NOTES
"Chief Complaint  Urinary Frequency (She states symptoms started last night. ), urinary discomfort, and Ear Fullness (She states her left ear feels full. )    Subjective    History of Present Illness      Patient presents to [facility] for   Urinary Frequency She states symptoms started last night.      urinary discomfort      Ear Fullness She states her left ear feels full.             Review of Systems   Constitutional: Negative.    HENT: Positive for ear pain.    Eyes: Negative.    Respiratory: Negative.    Cardiovascular: Negative.    Gastrointestinal: Negative.    Endocrine: Negative.    Genitourinary: Positive for dysuria.   Musculoskeletal: Negative.    Skin: Negative.    Allergic/Immunologic: Negative.    Neurological: Negative.    Hematological: Negative.    Psychiatric/Behavioral: Negative.        I have reviewed and agree with the HPI and ROS information as above.  Luz Marina Mehta, APRN     Objective   Vital Signs:   /73   Pulse 108   Temp 97.5 °F (36.4 °C)   Resp 16   Ht 160 cm (63\")   Wt 51.7 kg (114 lb)   SpO2 97%   BMI 20.19 kg/m²     BMI is within normal parameters. No other follow-up for BMI required.      Physical Exam  Constitutional:       Appearance: Normal appearance. She is well-developed.   HENT:      Head: Normocephalic and atraumatic.      Right Ear: External ear normal. A middle ear effusion is present.      Left Ear: External ear normal. A middle ear effusion is present.      Nose: Nose normal. No nasal tenderness or congestion.      Mouth/Throat:      Lips: Pink. No lesions.      Mouth: Mucous membranes are moist. No oral lesions.      Dentition: Normal dentition.      Pharynx: Oropharynx is clear. No pharyngeal swelling, oropharyngeal exudate or posterior oropharyngeal erythema.   Eyes:      General: Lids are normal. Vision grossly intact. No scleral icterus.        Right eye: No discharge.         Left eye: No discharge.      Extraocular " Movements: Extraocular movements intact.      Conjunctiva/sclera: Conjunctivae normal.      Right eye: Right conjunctiva is not injected.      Left eye: Left conjunctiva is not injected.      Pupils: Pupils are equal, round, and reactive to light.   Cardiovascular:      Rate and Rhythm: Normal rate and regular rhythm.      Heart sounds: Normal heart sounds. No murmur heard.    No gallop.   Pulmonary:      Effort: Pulmonary effort is normal.      Breath sounds: Normal breath sounds and air entry. No wheezing, rhonchi or rales.   Musculoskeletal:         General: No tenderness or deformity. Normal range of motion.      Cervical back: Full passive range of motion without pain, normal range of motion and neck supple.      Right lower leg: No edema.      Left lower leg: No edema.   Skin:     General: Skin is warm and dry.      Coloration: Skin is not jaundiced.      Findings: No rash.   Neurological:      Mental Status: She is alert and oriented to person, place, and time.      Cranial Nerves: Cranial nerves are intact.      Sensory: Sensation is intact.      Motor: Motor function is intact.      Coordination: Coordination is intact.      Gait: Gait is intact.   Psychiatric:         Attention and Perception: Attention normal.         Mood and Affect: Mood and affect normal.         Behavior: Behavior is not hyperactive. Behavior is cooperative.         Thought Content: Thought content normal.         Judgment: Judgment normal.          VISHNU-7:      PHQ-2 Depression Screening  Little interest or pleasure in doing things? 0-->not at all   Feeling down, depressed, or hopeless? 0-->not at all   PHQ-2 Total Score 0     PHQ-9 Depression Screening  Little interest or pleasure in doing things? 0-->not at all   Feeling down, depressed, or hopeless? 0-->not at all   Trouble falling or staying asleep, or sleeping too much?     Feeling tired or having little energy?     Poor appetite or overeating?     Feeling bad about yourself - or  that you are a failure or have let yourself or your family down?     Trouble concentrating on things, such as reading the newspaper or watching television?     Moving or speaking so slowly that other people could have noticed? Or the opposite - being so fidgety or restless that you have been moving around a lot more than usual?     Thoughts that you would be better off dead, or of hurting yourself in some way?     PHQ-9 Total Score 0   If you checked off any problems, how difficult have these problems made it for you to do your work, take care of things at home, or get along with other people?        Result Review  Data Reviewed:   The following data was reviewed by: SOHAN Rao on 07/22/2022:  Urinalysis With Culture If Indicated - Urine, Clean Catch (07/22/2022 15:23)  Urine Culture - Urine, Urine, Clean Catch (07/22/2022 15:23)  Urinalysis, Microscopic Only - Urine, Clean Catch (07/22/2022 15:23)             Assessment and Plan      Problem List Items Addressed This Visit        Endocrine and Metabolic    BMI 20.0-20.9, adult      Other Visit Diagnoses     Urinary tract infection with hematuria, site unspecified    -  Primary    Relevant Medications    nitrofurantoin, macrocrystal-monohydrate, (Macrobid) 100 MG capsule    Urinary frequency        Relevant Orders    Urinalysis With Culture If Indicated - (Completed)    Fluid level behind tympanic membrane of both ears        Relevant Medications    fluticasone (Flonase) 50 MCG/ACT nasal spray        Patient here today with complaints of urinary frequency, urgency, and burning.  Symptoms began last night.  She states this feels how she felt in the past with urinary tract infections.  Denies any fever.  She also complains of her ears feeling full and her hearing slightly muffled in her left ear.  Denies any pain specifically.  Fluid noted behind bilateral TMs, not infected.    Plan:    1.  Urinalysis ordered in office: Shows 1+ blood, moderate leuks, and  positive nitrites.  2.  Start Macrobid 100 mg twice daily x10 days.  3.  Flonase nasal spray sent.  Start over-the-counter allergy medication.  Patient states she has Allegra at home.  4.  Follow-up as needed if symptoms continue or worsen.      Follow Up   Return if symptoms worsen or fail to improve.  Patient was given instructions and counseling regarding her condition or for health maintenance advice. Please see specific information pulled into the AVS if appropriate.

## 2022-07-24 LAB — BACTERIA SPEC AEROBE CULT: ABNORMAL

## 2022-07-27 ENCOUNTER — TELEPHONE (OUTPATIENT)
Dept: FAMILY MEDICINE CLINIC | Facility: CLINIC | Age: 57
End: 2022-07-27

## 2022-07-27 DIAGNOSIS — H92.03 OTALGIA OF BOTH EARS: Primary | ICD-10-CM

## 2022-07-27 RX ORDER — METHYLPREDNISOLONE 4 MG/1
TABLET ORAL
Qty: 1 EACH | Refills: 0 | Status: SHIPPED | OUTPATIENT
Start: 2022-07-27

## 2022-07-27 NOTE — TELEPHONE ENCOUNTER
----- Message from SOHAN Rothman sent at 7/27/2022 11:14 AM CDT -----  Urine culture shows E. coli in her urine.  The medication she is on will cover this well.

## 2022-07-27 NOTE — TELEPHONE ENCOUNTER
----- Message from SOHAN Rothman sent at 7/27/2022 11:14 AM CDT -----  Urine culture shows E. coli in her urine.  The medication she is on will cover this well.  
Left message for pt to call back for results   
Hide Additional Notes?: No
Detail Level: Detailed

## 2022-07-27 NOTE — TELEPHONE ENCOUNTER
Spoke to patient and she voiced understanding.  She is requesting a steroid be called in as she states she is now having pain in her ears if you don't mind to still do that.

## 2023-09-06 ENCOUNTER — TELEPHONE (OUTPATIENT)
Dept: FAMILY MEDICINE CLINIC | Facility: CLINIC | Age: 58
End: 2023-09-06
Payer: COMMERCIAL